# Patient Record
Sex: FEMALE | URBAN - METROPOLITAN AREA
[De-identification: names, ages, dates, MRNs, and addresses within clinical notes are randomized per-mention and may not be internally consistent; named-entity substitution may affect disease eponyms.]

---

## 2024-03-26 ENCOUNTER — TELEPHONE (OUTPATIENT)
Dept: ORTHOPEDIC SURGERY | Facility: CLINIC | Age: 44
End: 2024-03-26
Payer: COMMERCIAL

## 2024-03-26 NOTE — TELEPHONE ENCOUNTER
ABIGAILM TO SCHEDULE NEW PATIENT APPT FOR RT SHOULDER PAIN-DECREASED ROM WITH EITHER DR. TORRES, LEVAR, OR ASHLEY (EARLIEST AVAL). REFERRAL FROM DR. REYES OFFICE.

## 2024-03-27 ENCOUNTER — TELEPHONE (OUTPATIENT)
Dept: ORTHOPEDIC SURGERY | Facility: CLINIC | Age: 44
End: 2024-03-27
Payer: COMMERCIAL

## 2024-03-29 ENCOUNTER — TELEPHONE (OUTPATIENT)
Dept: ORTHOPEDIC SURGERY | Facility: CLINIC | Age: 44
End: 2024-03-29
Payer: COMMERCIAL

## 2024-03-29 NOTE — TELEPHONE ENCOUNTER
LVM TO PATIENT TO SCHEDULE NEW PATIENT APPT, DUE TO 3 ATTEMPTS OF CONTACTING CLOSING REFERRAL, HOWEVER IT IS GOOD FOR UP TO A YEAR IF PATIENT CALLS BACK.      LVM TO REF OFFICE OF DR. REYES INFORMING THEM OF THIS AND ASKED FOR ANY ALTERNATIVE NUMBER JUST IN CASE PATIENT PHONE NUMBER HAS CHANGED.